# Patient Record
Sex: FEMALE | URBAN - METROPOLITAN AREA
[De-identification: names, ages, dates, MRNs, and addresses within clinical notes are randomized per-mention and may not be internally consistent; named-entity substitution may affect disease eponyms.]

---

## 2020-06-02 ENCOUNTER — LAB OUTSIDE AN ENCOUNTER (OUTPATIENT)
Dept: URBAN - METROPOLITAN AREA CLINIC 19 | Facility: CLINIC | Age: 55
End: 2020-06-02

## 2020-06-02 ENCOUNTER — OFFICE VISIT (OUTPATIENT)
Dept: URBAN - METROPOLITAN AREA CLINIC 19 | Facility: CLINIC | Age: 55
End: 2020-06-02
Payer: COMMERCIAL

## 2020-06-02 DIAGNOSIS — K51.80 OTHER ULCERATIVE COLITIS WITHOUT COMPLICATIONS: ICD-10-CM

## 2020-06-02 PROCEDURE — G8427 DOCREV CUR MEDS BY ELIG CLIN: HCPCS | Performed by: INTERNAL MEDICINE

## 2020-06-02 PROCEDURE — G8417 CALC BMI ABV UP PARAM F/U: HCPCS | Performed by: INTERNAL MEDICINE

## 2020-06-02 PROCEDURE — 99214 OFFICE O/P EST MOD 30 MIN: CPT | Performed by: INTERNAL MEDICINE

## 2020-06-02 PROCEDURE — 3017F COLORECTAL CA SCREEN DOC REV: CPT | Performed by: INTERNAL MEDICINE

## 2020-06-02 RX ORDER — MESALAMINE 1.2 G/1
TAKE 4 TABLETS BY ORAL ROUTE QD FOR 30 DAYS, THEN 2 TABLETS QD TABLET, DELAYED RELEASE ORAL 1
Qty: 240 | Refills: 1 | Status: ACTIVE | COMMUNITY
Start: 2020-04-15 | End: 2020-10-12

## 2020-06-02 RX ORDER — HYDROCORTISONE ACETATE 25 MG/1
INSERT 1 SUPPOSITORY (25 MG) BY RECTAL ROUTE 2 TIMES PER DAY FOR 14 DAYS SUPPOSITORY RECTAL 2
Qty: 28 | Refills: 2 | Status: ACTIVE | COMMUNITY
Start: 2020-05-13 | End: 2020-06-24

## 2020-06-02 RX ORDER — PREDNISONE 10 MG/1
TAKE 4 TABS/DAY X 7 DAYS, THEN 3 TABS/D X 7D, THEN 2 TABS/D X 7D, THEN 1 TAB/D X 7D, THEN STOP TABLET ORAL
Qty: 70 | Refills: 0 | Status: ACTIVE | COMMUNITY
Start: 2020-05-13 | End: 2020-06-10

## 2020-06-02 RX ORDER — LOSARTAN POTASSIUM 50 MG/1
TAKE 1 TABLET (50 MG) BY ORAL ROUTE ONCE DAILY TABLET ORAL 1
Qty: 0 | Refills: 0 | Status: ACTIVE | COMMUNITY
Start: 1900-01-01

## 2020-06-02 RX ORDER — MESALAMINE 1.2 G/1
4 TABLETS TABLET, DELAYED RELEASE ORAL ONCE A DAY
Qty: 120 TABLET | Refills: 2 | OUTPATIENT
Start: 2020-06-02 | End: 2020-08-31

## 2020-06-02 NOTE — HPI-TODAY'S VISIT:
Patient has ulcerative colitis diagnosed in 2008 that was in remission for many years. I first met the patient in 2017 - she was having recurrent symptoms at that time, so I performed a colonoscopy in 2018 that revealed chronic active colitis consistent with ulcerative colitis. She responded well to Lialda, but she only took it for a short while. She changed her diet, but then she started having abdominal cramping and diarrhea with minimal bleeding earlier this year. I placed her on Lialda 4.8g/day, but this was ineffective, so I placed her on prednisone taper. She is currently on her last 2 days of prednisone 20mg and will go down to 10mg for another week. Doing well - less cramping, only 2 BMs/day, and minimal rectal bleeding that she relates to hemorrhoids.

## 2020-06-03 LAB
A/G RATIO: 2
ALBUMIN: 4.7
ALKALINE PHOSPHATASE: 74
ALT (SGPT): 23
AST (SGOT): 13
BASO (ABSOLUTE): 0
BASOS: 0
BILIRUBIN, TOTAL: 0.2
BUN/CREATININE RATIO: 29
BUN: 22
C-REACTIVE PROTEIN, QUANT: 3
CALCIUM: 9.6
CARBON DIOXIDE, TOTAL: 26
CHLORIDE: 99
CREATININE: 0.76
EGFR IF AFRICN AM: 104
EGFR IF NONAFRICN AM: 90
EOS (ABSOLUTE): 0
EOS: 0
GLOBULIN, TOTAL: 2.4
GLUCOSE: 102
HEMATOCRIT: 42.5
HEMATOLOGY COMMENTS:: (no result)
HEMOGLOBIN: 13.8
IMMATURE CELLS: (no result)
IMMATURE GRANS (ABS): 0
IMMATURE GRANULOCYTES: 0
LYMPHS (ABSOLUTE): 1.5
LYMPHS: 9
MCH: 28.9
MCHC: 32.5
MCV: 89
MONOCYTES(ABSOLUTE): 0.4
MONOCYTES: 2
NEUTROPHILS (ABSOLUTE): 13.6
NEUTROPHILS: 89
NRBC: (no result)
PLATELETS: 381
POTASSIUM: 4.5
PROTEIN, TOTAL: 7.1
RBC: 4.78
RDW: 12.5
SEDIMENTATION RATE-WESTERGREN: 28
SODIUM: 138
VITAMIN D, 25-HYDROXY: 33.9
WBC: 15.6

## 2020-06-15 ENCOUNTER — TELEPHONE ENCOUNTER (OUTPATIENT)
Dept: URBAN - METROPOLITAN AREA CLINIC 92 | Facility: CLINIC | Age: 55
End: 2020-06-15

## 2020-06-15 RX ORDER — PREDNISONE 10 MG/1
2 TABLETS TABLET ORAL ONCE A DAY
Qty: 60 | Refills: 1 | OUTPATIENT
Start: 2020-06-15 | End: 2020-08-14

## 2020-07-13 ENCOUNTER — TELEPHONE ENCOUNTER (OUTPATIENT)
Dept: URBAN - METROPOLITAN AREA CLINIC 92 | Facility: CLINIC | Age: 55
End: 2020-07-13

## 2020-07-13 ENCOUNTER — LAB OUTSIDE AN ENCOUNTER (OUTPATIENT)
Dept: URBAN - METROPOLITAN AREA CLINIC 18 | Facility: CLINIC | Age: 55
End: 2020-07-13

## 2020-07-23 ENCOUNTER — OFFICE VISIT (OUTPATIENT)
Dept: URBAN - METROPOLITAN AREA CLINIC 19 | Facility: CLINIC | Age: 55
End: 2020-07-23
Payer: COMMERCIAL

## 2020-07-23 DIAGNOSIS — K51.80 OTHER ULCERATIVE COLITIS WITHOUT COMPLICATION: ICD-10-CM

## 2020-07-23 PROBLEM — 64766004: Status: ACTIVE | Noted: 2020-07-23

## 2020-07-23 PROCEDURE — G8417 CALC BMI ABV UP PARAM F/U: HCPCS | Performed by: INTERNAL MEDICINE

## 2020-07-23 PROCEDURE — G8427 DOCREV CUR MEDS BY ELIG CLIN: HCPCS | Performed by: INTERNAL MEDICINE

## 2020-07-23 PROCEDURE — 99213 OFFICE O/P EST LOW 20 MIN: CPT | Performed by: INTERNAL MEDICINE

## 2020-07-23 PROCEDURE — G9903 PT SCRN TBCO ID AS NON USER: HCPCS | Performed by: INTERNAL MEDICINE

## 2020-07-23 PROCEDURE — 3017F COLORECTAL CA SCREEN DOC REV: CPT | Performed by: INTERNAL MEDICINE

## 2020-07-23 RX ORDER — MESALAMINE 1.2 G/1
TAKE 4 TABLETS BY ORAL ROUTE QD FOR 30 DAYS, THEN 2 TABLETS QD TABLET, DELAYED RELEASE ORAL 1
Qty: 240 | Refills: 1 | Status: ACTIVE | COMMUNITY
Start: 2020-04-15 | End: 2020-10-12

## 2020-07-23 RX ORDER — MESALAMINE 1.2 G/1
4 TABLETS TABLET, DELAYED RELEASE ORAL ONCE A DAY
Qty: 360 TABLET | Refills: 3
Start: 2020-04-15 | End: 2021-04-10

## 2020-07-23 RX ORDER — PREDNISONE 10 MG/1
2 TABLETS TABLET ORAL ONCE A DAY
Qty: 60 | Refills: 1 | Status: ACTIVE | COMMUNITY
Start: 2020-06-15 | End: 2020-08-14

## 2020-07-23 RX ORDER — MESALAMINE 1.2 G/1
4 TABLETS TABLET, DELAYED RELEASE ORAL ONCE A DAY
Qty: 120 TABLET | Refills: 2 | Status: ACTIVE | COMMUNITY
Start: 2020-06-02 | End: 2020-08-31

## 2020-07-23 RX ORDER — LOSARTAN POTASSIUM 50 MG/1
TAKE 1 TABLET (50 MG) BY ORAL ROUTE ONCE DAILY TABLET ORAL 1
Qty: 0 | Refills: 0 | Status: ACTIVE | COMMUNITY
Start: 1900-01-01

## 2020-07-23 NOTE — HPI-TODAY'S VISIT:
11/30/17: Patient was told in 2008 that she had ulcerative colitis by colonoscopy. She was having some mucus/blood in stool with increased urgency at the time. Asacol and Cortenemas were not effective for 4 months; she eventually placed herself on a natural diet that prevented symptoms until last month. She had a normal colonoscopy last year. Last month, she had similar symptoms and let them go for about 2 weeks before going to see Darnell at the [facility] urgent care. One week of Cipro prescribed by Dr. Pan did not help. Darnell ordered labs and CT scan. Colitis was seen on CT but labs were normal. Prednisone and Rowasa enemas were prescribed with some relief of symptoms. She took one week of prednisone but still taking Rowasa.   2/20/18: Patient returns for follow-up. On 1/16/18, I performed a colonoscopy/ileoscopy that revealed left-sided ulcerative colitis. Her CRP was elevated on her labs. She started Lialda 4.8 g/day and felt better within 2 weeks. No pain, diarrhea, or bleeding.   4/15/20: Patient returns after a 2-year hiatus to discuss her left-sided ulcerative colitis. She took the Lialda 4.8 g/day that I prescribed for about a month, but since she felt better, she discontinued the medication. She had not had any flare-ups until 2 months ago. She started having mucus in the stool with occasional blood. Severe bowel urgency but minimal cramping. She linked these flare-ups to changes in diet; when she wants to lose weight, she changes to a low-sugar, low-fat, high-fiber diet. We discussed the natural history and management of ulcerative colitis. Also will need surveillance for dysplasia at regular intervals.  7/23/20: Patient presents to discuss her ulcerative colitis.  She recently came down from 20mg of prednisone to 10mg (last 1 1/2 weeks), and she takes mesalamine 4.8 g/day as well. She has 4 to 5 BMs/day on a bad day along with minimal cramping and occasional mucus.  She states that she is trying to avoid a biologic medication, but she is not quite where she wants to be. I recommended a colonoscopy, but she wants to give the steroids more time - will remain on 10mg for a month and be reevaluated.

## 2020-09-11 ENCOUNTER — DASHBOARD ENCOUNTERS (OUTPATIENT)
Age: 55
End: 2020-09-11

## 2020-09-11 ENCOUNTER — OFFICE VISIT (OUTPATIENT)
Dept: URBAN - METROPOLITAN AREA CLINIC 19 | Facility: CLINIC | Age: 55
End: 2020-09-11
Payer: COMMERCIAL

## 2020-09-11 DIAGNOSIS — K51.80 OTHER ULCERATIVE COLITIS WITHOUT COMPLICATION: ICD-10-CM

## 2020-09-11 PROCEDURE — 82306 VITAMIN D 25 HYDROXY: CPT | Performed by: INTERNAL MEDICINE

## 2020-09-11 PROCEDURE — 82607 VITAMIN B-12: CPT | Performed by: INTERNAL MEDICINE

## 2020-09-11 PROCEDURE — 3017F COLORECTAL CA SCREEN DOC REV: CPT | Performed by: INTERNAL MEDICINE

## 2020-09-11 PROCEDURE — G8420 CALC BMI NORM PARAMETERS: HCPCS | Performed by: INTERNAL MEDICINE

## 2020-09-11 PROCEDURE — G9903 PT SCRN TBCO ID AS NON USER: HCPCS | Performed by: INTERNAL MEDICINE

## 2020-09-11 PROCEDURE — G8427 DOCREV CUR MEDS BY ELIG CLIN: HCPCS | Performed by: INTERNAL MEDICINE

## 2020-09-11 PROCEDURE — 99214 OFFICE O/P EST MOD 30 MIN: CPT | Performed by: INTERNAL MEDICINE

## 2020-09-11 RX ORDER — LOSARTAN POTASSIUM 50 MG/1
TAKE 1 TABLET (50 MG) BY ORAL ROUTE ONCE DAILY TABLET ORAL 1
Qty: 0 | Refills: 0 | Status: ACTIVE | COMMUNITY
Start: 1900-01-01

## 2020-09-11 RX ORDER — MESALAMINE 1.2 G/1
4 TABLETS TABLET, DELAYED RELEASE ORAL ONCE A DAY
Qty: 360 TABLET | Refills: 3 | Status: ACTIVE | COMMUNITY
Start: 2020-04-15 | End: 2021-04-10

## 2020-09-11 RX ORDER — SODIUM, POTASSIUM,MAG SULFATES 17.5-3.13G
254 ML SOLUTION, RECONSTITUTED, ORAL ORAL ONCE
Qty: 1 KIT | Refills: 0 | OUTPATIENT
Start: 2020-09-11 | End: 2020-09-12

## 2020-09-11 RX ORDER — MESALAMINE 1.2 G/1
4 TABLETS TABLET, DELAYED RELEASE ORAL ONCE A DAY
Qty: 360 TABLET | Refills: 3

## 2020-09-11 NOTE — HPI-TODAY'S VISIT:
11/30/17:  The patient is a 54 year old /White female, who presents on referral from Kanu Pan MD, for a gastroenterology evaluation for abdominal pain and diarrhea. A copy of this document will be sent to the referring provider.  Patient was told in 2008 that she had ulcerative colitis by colonoscopy. She was having some mucus/blood in stool with increased urgency at the time. Asacol and Cortenemas were not effective for 4 months; she eventually placed herself on a natural diet that prevented symptoms until last month. She had a normal colonoscopy last year. Last month, she had similar symptoms and let them go for about 2 weeks before going to see Darnell at the [facility] urgent care. One week of Cipro prescribed by Dr. Pan did not help. Darnell ordered labs and CT scan. Colitis was seen on CT but labs were normal. Prednisone and Rowasa enemas were prescribed with some relief of symptoms. She took one week of prednisone but still taking Rowasa. 2/20/18: Patient returns for follow-up. On 1/16/18, I performed a colonoscopy/ileoscopy that revealed left-sided ulcerative colitis. Her CRP was elevated on her labs. She started Lialda 4.8 g/day and felt better within 2 weeks. No pain, diarrhea, or bleeding.   4/15/20: Patient returns after a 2-year hiatus to discuss her left-sided ulcerative colitis. She took the Lialda 4.8 g/day that I prescribed for about a month, but since she felt better, she discontinued the medication. She had not had any flare-ups until 2 months ago. She started having mucus in the stool with occasional blood. Severe bowel urgency but minimal cramping. She linked these flare-ups to changes in diet; when she wants to lose weight, she changes to a low-sugar, low-fat, high-fiber diet. We discussed the natural history and management of ulcerative colitis. Also will need surveillance for dysplasia at regular intervals.  9/11/20:  Patient presents for follow-up of her UC.  Still having 4-8 BMs/day - somewhat hesitant to start a biologic/immunosuppressant.  Still taking Lialda 4.8 g/day - off steroids.

## 2020-09-12 LAB
A/G RATIO: 1.8
ALBUMIN: 4.6
ALKALINE PHOSPHATASE: 93
ALT (SGPT): 29
AST (SGOT): 21
BILIRUBIN, TOTAL: 0.3
BUN/CREATININE RATIO: 20
BUN: 16
C-REACTIVE PROTEIN, QUANT: 3
CALCIUM: 9.7
CARBON DIOXIDE, TOTAL: 24
CHLORIDE: 98
CREATININE: 0.82
EGFR IF AFRICN AM: 94
EGFR IF NONAFRICN AM: 81
GLOBULIN, TOTAL: 2.5
GLUCOSE: 84
HEMATOCRIT: 43.2
HEMOGLOBIN: 14.2
MCH: 28.2
MCHC: 32.9
MCV: 86
NRBC: (no result)
PLATELETS: 351
POTASSIUM: 5.2
PROTEIN, TOTAL: 7.1
RBC: 5.03
RDW: 12
SEDIMENTATION RATE-WESTERGREN: 33
SODIUM: 138
VITAMIN B12: 851
VITAMIN D, 25-HYDROXY: 38.8
WBC: 9.2

## 2020-09-14 ENCOUNTER — ERX REFILL RESPONSE (OUTPATIENT)
Age: 55
End: 2020-09-14

## 2020-09-14 RX ORDER — MESALAMINE 1.2 G/1
TAKE 2 TABLETS BY MOUTH  DAILY TABLET, DELAYED RELEASE ORAL
Qty: 180 | Refills: 0

## 2020-10-06 ENCOUNTER — OFFICE VISIT (OUTPATIENT)
Dept: URBAN - METROPOLITAN AREA CLINIC 19 | Facility: CLINIC | Age: 55
End: 2020-10-06

## 2020-10-07 ENCOUNTER — TELEPHONE ENCOUNTER (OUTPATIENT)
Dept: URBAN - METROPOLITAN AREA CLINIC 19 | Facility: CLINIC | Age: 55
End: 2020-10-07

## 2020-10-08 ENCOUNTER — TELEPHONE ENCOUNTER (OUTPATIENT)
Dept: URBAN - METROPOLITAN AREA CLINIC 19 | Facility: CLINIC | Age: 55
End: 2020-10-08

## 2020-10-12 ENCOUNTER — OFFICE VISIT (OUTPATIENT)
Dept: URBAN - METROPOLITAN AREA LAB 2 | Facility: LAB | Age: 55
End: 2020-10-12

## 2021-04-05 ENCOUNTER — TELEPHONE ENCOUNTER (OUTPATIENT)
Dept: URBAN - METROPOLITAN AREA CLINIC 19 | Facility: CLINIC | Age: 56
End: 2021-04-05

## 2021-04-05 RX ORDER — MESALAMINE 1.2 G/1
TAKE 2 TABLETS BY MOUTH  DAILY TABLET, DELAYED RELEASE ORAL ONCE A DAY
Qty: 180 | Refills: 3
End: 2022-04-02